# Patient Record
(demographics unavailable — no encounter records)

---

## 2024-12-05 NOTE — PHYSICAL EXAM
[Normal Venous Pressure] : normal venous pressure [Normal S1, S2] : normal S1, S2 [Clear Lung Fields] : clear lung fields [Normal Gait] : normal gait [No Edema] : no edema [de-identified] : RRR

## 2024-12-05 NOTE — PHYSICAL EXAM
[Normal Venous Pressure] : normal venous pressure [Normal S1, S2] : normal S1, S2 [Clear Lung Fields] : clear lung fields [Normal Gait] : normal gait [No Edema] : no edema [de-identified] : RRR

## 2024-12-05 NOTE — DISCUSSION/SUMMARY
[FreeTextEntry1] : TG good.  continue fish oil  CAC zero in 2024/hscrp normal  advised to follow dash diet, keep sodium less than 1500mg daily, increase fiber.  exercise 30 minutes 5 times/week   can start cholestoff and consider statin sdm with patient.  pt wants plant sterol  6 months.

## 2024-12-05 NOTE — HISTORY OF PRESENT ILLNESS
[FreeTextEntry1] : Patient with HLD, family h/o uncle MI at 40. Metabolic syndrome, bradycardia   pt with chronic hypertg and hdls were lower in the past. pt exercises 4 to 5 times/ week with weight lifting and in summer does cardio running or bike. pt denies cp or sob, pt denies palpitations. chronic tg for 10 years and not excessive poor diet and denies heavy alcohol.   LDL: 144, T, HDL: 38  23: EF: 55-65%  24: Echo: LVEF 57%, e' sept: 0.15 m/s, CO: 2.5 l/min  24: cac: ZERO.  24: HDL: 43, T, LDL: 157, APOB: 121, AST: 47, BNP: <36, LPA: <10 hscrp: < 0.2  Pt has improved hdl and TG now. vs last visit. p exercising more.  pt going to gym 3 to 4 days.  ascvd: 1.7%  24: 24: HDL: 38, T, LDL: 129, BNP: 37

## 2024-12-17 NOTE — DISCUSSION/SUMMARY
[FreeTextEntry1] : TG good.  continue fish oil  CAC zero in 2024/hscrp normal  advised to follow dash diet, keep sodium less than 1500mg daily, increase fiber.  exercise 30 minutes 5 times/week  continue cholestoff does not want statin for now 2024  pt wants plant sterol  6 months. 2decho/bloodwork

## 2024-12-17 NOTE — PHYSICAL EXAM
[Normal Venous Pressure] : normal venous pressure [Normal S1, S2] : normal S1, S2 [Clear Lung Fields] : clear lung fields [Normal Gait] : normal gait [No Edema] : no edema [de-identified] : RRR

## 2024-12-17 NOTE — PHYSICAL EXAM
[Normal Venous Pressure] : normal venous pressure [Normal S1, S2] : normal S1, S2 [Clear Lung Fields] : clear lung fields [Normal Gait] : normal gait [No Edema] : no edema [de-identified] : RRR

## 2024-12-17 NOTE — HISTORY OF PRESENT ILLNESS
[FreeTextEntry1] : Patient with HLD, family h/o uncle MI at 40. Metabolic syndrome, bradycardia   pt with chronic hypertg and hdls were lower in the past. pt exercises 4 to 5 times/ week with weight lifting and in summer does cardio running or bike. pt denies cp or sob, pt denies palpitations. chronic tg for 10 years and not excessive poor diet and denies heavy alcohol.   LDL: 144, T, HDL: 38  23: EF: 55-65%  24: Echo: LVEF 57%, e' sept: 0.15 m/s, CO: 2.5 l/min  24: cac: ZERO.  24: HDL: 43, T, LDL: 157, APOB: 121, AST: 47, BNP: <36, LPA: <10 hscrp: < 0.2  Pt has improved hdl and TG now. vs last visit. p exercising more.  pt going to gym 3 to 4 days.  ascvd: 1.7%  24: 24: HDL: 38 worsened, T increased, LDL: 129, BNP: 37 ldl improved on plant sterol  pt says no bread and dairy and exercising regulary with no issues, pt does not have cp or sob.  pt running 10 miles later today but usually 10 miles/week.  pt had episodes of hives that he is not sure what due to but now resolved and not due to meds.